# Patient Record
Sex: MALE | Race: WHITE
[De-identification: names, ages, dates, MRNs, and addresses within clinical notes are randomized per-mention and may not be internally consistent; named-entity substitution may affect disease eponyms.]

---

## 2018-05-31 ENCOUNTER — HOSPITAL ENCOUNTER (OUTPATIENT)
Dept: HOSPITAL 41 - JD.SDS | Age: 58
Discharge: HOME | End: 2018-05-31
Attending: ORTHOPAEDIC SURGERY
Payer: COMMERCIAL

## 2018-05-31 DIAGNOSIS — I10: ICD-10-CM

## 2018-05-31 DIAGNOSIS — E78.5: ICD-10-CM

## 2018-05-31 DIAGNOSIS — Z79.899: ICD-10-CM

## 2018-05-31 DIAGNOSIS — N40.0: ICD-10-CM

## 2018-05-31 DIAGNOSIS — M94.212: ICD-10-CM

## 2018-05-31 DIAGNOSIS — M75.122: Primary | ICD-10-CM

## 2018-05-31 DIAGNOSIS — F41.8: ICD-10-CM

## 2018-05-31 DIAGNOSIS — M19.049: ICD-10-CM

## 2018-05-31 PROCEDURE — 64415 NJX AA&/STRD BRCH PLXS IMG: CPT

## 2018-05-31 PROCEDURE — 29827 SHO ARTHRS SRG RT8TR CUF RPR: CPT

## 2018-05-31 PROCEDURE — 87641 MR-STAPH DNA AMP PROBE: CPT

## 2018-05-31 NOTE — PCM.SN
- Free Text/Narrative


Note: 





Anesthesia Note: (Left Interscalene Block Note)


Date: 5/31/2018


Time Out: 100


Start: 1100


Stop: 1130





Surgical Procedure:  Left Shoulder Video Arthroscopy with Rotator Cuff Repair


Current Procedure: Left interscalene block under US guidance for postoperative 

pain control requested by Dr. Turpin.     


Patient chart reviewed, risk/benefits discussed with patient, consent obtained.


Patient positioned supine, monitors/alarms on, oxygen placed via nasal cannula 

at 2 LPM.





IV sedation administered:


Versed 2mg IV @ 1100, Fentanyl 50mcg IV @ 1100


Thick muscular neck noted. Difficult visualization. Depth increased on 

ultrasound to view subclavian artery and nerve plexus.


Left shoulder prepped with two chloropreps. Sterile drapes placed with aseptic 

technique noted.  Under US guidance, left subclavian artery visualized along 

with the left brachial plexus.  Plexus followed up to C6 cricoid level, and 

area localized with 2mls of 1% lidocaine.  


22gauge 2 inch stimiplex needle advanced under US with 0.6mV with stimulation 

of biceps noted. Good stimulation noted with decreased voltage and absent at 

0.2mVs.


Incremental dosing of 5mls with negative aspiration noted prior to each 

injection of 0.5% ropivacaine with 1:200,000 epinephrine. Total volume=30mls.


Please refer to nurses noted for vital signs.





Tory Vazquez CRNA

## 2018-05-31 NOTE — PCM.POSTAN
POST ANESTHESIA ASSESSMENT





- MENTAL STATUS


Mental Status: Alert





- VITAL SIGNS


Pulse Rate: 99


SaO2: 93 (5 LPM nasal cannula)


Resp Rate: 18


Blood Pressure: 134/81


Temperature: 36.2 C





- RESPIRATORY


Respiratory Status: Respiratory Rate WNL, Airway Patent, O2 Saturation Stable, 

Supplemental Oxygen





- CARDIOVASCULAR


CV Status: Pulse Rate WNL, Blood Pressure Stable





- GASTROINTESTINAL


GI Status: No Symptoms





- POST OP HYDRATION


Hydration Status: Adequate & Stable

## 2018-05-31 NOTE — PCM.PREANE
Preanesthetic Assessment





- Anesthesia/Transfusion/Family Hx


Anesthesia History: Prior Anesthesia Without Reaction


Family History of Anesthesia Reaction: No


Transfusion History: No Prior Transfusion(s)


Intubation History: Unknown





- Review of Systems


General: No Symptoms


Pulmonary: No Symptoms


Cardiovascular: No Symptoms


Gastrointestinal: No Symptoms


Neurological: Other (Back surgery one year ago. No problems with pain into his 

legs anymore. He did  a firehose yeasterday and strain his back. It 

feels sore today. No pain radiating to his legs. )


Other: Reports: Anxiety





- Physical Assessment


NPO Status Date: 05/30/18


NPO Status Time: 20:00


O2 Sat by Pulse Oximetry: 95


Respiratory Rate: 20


Vital Signs: 





 Last Vital Signs











Temp  37.2 C   05/31/18 09:20


 


Pulse  76   05/31/18 09:20


 


Resp  20   05/31/18 09:20


 


BP  141/83 H  05/31/18 09:20


 


Pulse Ox  95   05/31/18 09:20











Height: 1.8 m


Weight: 111.584 kg


ASA Class: 2


Mental Status: Alert & Oriented x3


Airway Class: Mallampati = 2


Dentition: Reports: Normal Dentition


Thyro-Mental Finger Breadths: 3


Mouth Opening Finger Breadths: 3


ROM/Head Extension: Full


Lungs: Clear to Auscultation, Normal Respiratory Effort


Cardiovascular: Regular Rate, Regular Rhythm





- Lab


Values: 





 Laboratory Last Values











MRSA (PCR)  Negative   05/25/18  10:29    














- Allergies


Allergies/Adverse Reactions: 


 Allergies











Allergy/AdvReac Type Severity Reaction Status Date / Time


 


No Known Allergies Allergy   Verified 05/30/18 12:37














- Acknowledgements


Anesthesia Type Planned: General Anesthesia, Regional Block


Pt an Appropriate Candidate for the Planned Anesthesia: Yes


Alternatives and Risks of Anesthesia Discussed w Pt/Guardian: Yes


Pt/Guardian Understands and Agrees with Anesthesia Plan: Yes





PreAnesthesia Questionnaire


HEENT History: Reports: None


Cardiovascular History: Reports: High Cholesterol, Hypertension, Other (See 

Below)


Other Cardiovascular History: tachycardia


Respiratory History: Reports: None


Genitourinary History: Reports: BPH, Other (See Below)


Other Genitourinary History: prostatitis, nocturia


OB/GYN History: Reports: None


Musculoskeletal History: Reports: Arthritis, Back Pain, Chronic, Other (See 

Below)


Other Musculoskeletal History: chronic lumbar radiculopathy, rotator cuff 

tendinitis, lumbar disc disease, martin's neuroma, pes cavus, spinal stenosis, 

rotator cuff rupture, left shoulder impingment


Neurological History: Reports: Other (See Below)


Other Neuro History: laminectomies


Psychiatric History: Reports: Anxiety, Depression


Endocrine/Metabolic History: Reports: None


Hematologic History: Reports: None


Immunologic History: Reports: None


Oncologic (Cancer) History: Reports: None


Dermatologic History: Reports: None





- Infectious Disease History


Infectious Disease History: Reports: Other (See Below)


Other Infectious Disease History: sepsis





- Past Surgical History


Head Surgeries/Procedures: Reports: None


HEENT Surgical History: Reports: None


Cardiovascular Surgical History: Reports: None


Respiratory Surgical History: Reports: None


GI Surgical History: Reports: Appendectomy, Colonoscopy


Female  Surgical History: Reports: None


Male  Surgical History: Reports: None


Endocrine Surgical History: Reports: None


Neurological Surgical History: Reports: None


Musculoskeletal Surgical History: Reports: None


Oncologic Surgical History: Reports: None


Dermatological Surgical History: Reports: None





- SUBSTANCE USE


Smoking Status *Q: Never Smoker


Recreational Drug Use History: No





- HOME MEDS


Home Medications: 


 Home Meds





Lisinopril 20 mg PO DAILY 03/02/18 [History]


Rosuvastatin [Crestor] 10 mg PO DAILY 03/02/18 [History]


Tamsulosin HCl 0.4 mg PO DAILY 03/02/18 [History]


Acetaminophen/HYDROcodone [Norco 325-5 MG] 1 - 2 tab PO Q6H PRN #40 tablet 05/31 /18 [Rx]


Cyclobenzaprine [Flexeril] 10 mg PO Q8H PRN #40 tab 05/31/18 [Rx]











- CURRENT (IN HOUSE) MEDS


Current Meds: 





 Current Medications





Lactated Ringer's (Ringers, Lactated)  1,000 mls @ 125 mls/hr IV ASDIRECTED NALLELY


   Stop: 05/31/18 23:00


   Last Admin: 05/31/18 09:30 Dose:  125 mls/hr


Lidocaine/Sodium Bicarbonate (Buffered Lidocaine 1% In Ns 8.4%)  0.25 ml IDERM 

ONETIME PRN


   PRN Reason: Prior to IV Start


   Stop: 05/31/18 18:00


   Last Admin: 05/31/18 09:29 Dose:  0.25 ml


Sodium Chloride (Saline Flush)  10 ml FLUSH ASDIRECTED PRN


   PRN Reason: Keep Vein Open


   Stop: 05/31/18 18:00





Discontinued Medications





Epinephrine HCl (Adrenalin)  3 mg IV ONETIME ONE


   Stop: 05/30/18 08:01


Fentanyl (Sublimaze) Confirm Administered Dose 250 mcg .ROUTE .STK-MED ONE


   Stop: 05/31/18 09:48


Lactated Ringer's (Ringers, Lactated)  1,000 mls @ 125 mls/hr IV ASDIRECTED NALLELY


   Stop: 04/05/18 23:00


Lidocaine/Sodium Bicarbonate (Buffered Lidocaine 1% In Ns 8.4%)  0.25 ml IDERM 

ONETIME PRN


   PRN Reason: Prior to IV Start


   Stop: 04/05/18 18:00


Propofol (Diprivan  20 Ml) Confirm Administered Dose 200 mg .ROUTE .STK-MED ONE


   Stop: 05/31/18 09:49


Sodium Chloride (Saline Flush)  10 ml FLUSH ASDIRECTED PRN


   PRN Reason: Keep Vein Open


   Stop: 04/05/18 18:00

## 2018-06-05 NOTE — PCM.OPNOTE
- General Post-Op/Procedure Note


Date of Surgery/Procedure: 05/31/18


Operative Procedure(s): left shoulder video arthroscopy with rotator cuff 

repair and limited debridement


Pre Op Diagnosis: left shoulder rotator cuff tear


Post-Op Diagnosis: left shoulder supraspinatus tear


Anesthesia Technique: General ET Tube, Regional Block


Primary Surgeon: Ankit Turpin


Anesthesia Provider: Usha León


Assistant: Alida Ingram


EBL in mLs: 5


Complications: None


Condition: Good

## 2018-06-05 NOTE — OR
DATE OF OPERATION:  05/31/2018

 

SURGEON:  Ankit Turpin MD

 

OPERATION PERFORMED:  Left shoulder video arthroscopy with rotator cuff repair

and limited debridement.

 

PREOPERATIVE DIAGNOSIS:

Left shoulder rotator cuff tear.

 

POSTOPERATIVE DIAGNOSIS:

Left shoulder rotator cuff tear.

 

ANESTHESIA:

General endotracheal intubation with regional interscalene block.

 

ANESTHESIA PROVIDER:

Usha León CRNA.

 

ASSISTANT:

Alida Ingram PA-C.

 

ESTIMATED BLOOD LOSS:

5 mL.

 

COMPLICATIONS:

None.

 

CONDITION:

Stable.

 

DESCRIPTION OF PROCEDURE:

The patient was identified in the preoperative holding area.  Proper site was

marked and identified by the surgeon.  The patient was taken back to the

operating theater, where after adequate anesthesia, the patient was placed in

the lazy right lateral decubitus position.  A wedge was placed posteriorly.  All

bony prominences were well padded.  The patient was secured to the table.  Left

upper extremity was then sterilely prepped and draped in the usual sterile

fashion.  OR time-out was performed.  The patient received 2 g of IV Ancef and

15 pounds traction was applied to the left upper extremity.  At this time,

standard posterior incision was made.  The scope trocar was introduced into the

glenohumeral joint.  At this time, the patient was noted to have significant

fraying in the anterior labrum.  Anterior portal was then created and cursory

examination was performed.  The patient had grade 2 chondromalacia or just a

small portion of the glenoid.  There was no chondromalacia of the humeral head.

The subscapularis tendon was intact.  The patient was noted to have full-

thickness tear to the anterior portion of the supraspinatus.  The rest of the

rotator cuff was intact.  At this time, we did a limited debridement of the

anterior portion of the joint including the anterior labrum, which showed

significant fraying.  Once this was back to a stable edge, attention was turned

to the subacromial space.  The scope trocar was placed through the posterior

portal into the subacromial space as well as the anterior portal was.  At this

time, a lateral portal was then created as well.  The anterior tear of the

supraspinatus was identified.  A good bony bleeding bed was then created

anteriorly for repair.  One single 4.75 mm Arthrex SwiveLock anchor was placed

anteriorly in the footprint and 2 limbs of FiberTape and 2 limbs of FiberWire

were then placed from anterior to posterior through the tear.  At this time, the

2 limbs of the FiberWire were then tied for medial row repair.  A punch was used

out laterally and another 4.75 mm Arthrex SwiveLock anchor was placed out

laterally bringing all 4 limbs of the medial row suture into it for a double row

repair.  These were adequately tensioned and was found to have adequate

watertight repair of the anterior supraspinatus tear.  At this time, a limited

debridement was then performed of the subacromial space.  The patient had a type

1 acromion, so subacromial decompression was completed at this time.  Excess

saline was drained from the shoulder.  3-0 nylon simple suture was used for

closure of the skin.  The patient was placed in sterile soft dressing and a

pillow sling and sent to PACU in stable condition.

 

DD:  06/05/2018 07:14:10

DT:  06/05/2018 07:37:26  DUGLAS

Job #:  [id number]